# Patient Record
(demographics unavailable — no encounter records)

---

## 2024-12-24 NOTE — PHYSICAL EXAM
[Normal S1, S2] : normal S1, S2 [Murmur] : murmur [Normal] : moves all extremities, no focal deficits, normal speech [de-identified] :  No carotid bruits auscultated bilaterally. [de-identified] : 2/6 systolic ejection murmur base of heart

## 2024-12-24 NOTE — REVIEW OF SYSTEMS
[Feeling Fatigued] : feeling fatigued [Joint Pain] : joint pain [Joint Stiffness] : joint stiffness [Negative] : Neurological [FreeTextEntry5] : see HPI [FreeTextEntry6] : see HPI [FreeTextEntry9] : chronic back pain

## 2024-12-24 NOTE — CARDIOLOGY SUMMARY
[de-identified] : 12/24/2024, NSR with possible YOANNA 5/31/2024, NSR with PVC, combined atrial enlargement. [de-identified] : 6/21/2024, LV EF 55-60%, mild MR, mild AI, calcified AV with no stenosis.

## 2024-12-24 NOTE — HISTORY OF PRESENT ILLNESS
[FreeTextEntry1] : Historical Perspective: 72 year old female with chronic back pain, HTN presents for a cardiac evaluation. Patient had murmur auscultated by PCP. Patient not very active, but denies any chest pain, dyspnea or palpitations. BP elevated today, but patient states she forgot to take BP medication today.  There is no history of MI, CVA, CHF, or previous coronary intervention.  Current Health Status: Patient with no chest pain, SOB, or palpitations. No hospitalizations since seeing me last. Remains compliant with medications and reports no adverse effects. Notes better BP control with Metoprolol. Hasn't taken medications today yet. Usually takes medications between 10-11am

## 2024-12-24 NOTE — DISCUSSION/SUMMARY
[FreeTextEntry1] : 1. Aortic Valve Insufficiency: on echocardiogram from 6/21/2024, AV with calcification but no stenosis. Mild AI, recommend periodic echo surveillance.   2. HTN: continue olmesartan 20mg daily. Patient notes very labile BPs in the past, which she states metoprolol has helped with. Recommend increasing Toprol XL 25mg daily--BID to help with this labile HTN  Follow up in 6 months. [EKG obtained to assist in diagnosis and management of assessed problem(s)] : EKG obtained to assist in diagnosis and management of assessed problem(s)

## 2025-07-15 NOTE — CARDIOLOGY SUMMARY
[de-identified] : 7/15/2025, NSR with possible combined atrial enlargement.  12/24/2024, NSR with possible YOANNA 5/31/2024, NSR with PVC, combined atrial enlargement. [de-identified] : 6/21/2024, LV EF 55-60%, mild MR, mild AI, calcified AV with no stenosis.

## 2025-07-15 NOTE — PHYSICAL EXAM
[Normal S1, S2] : normal S1, S2 [Murmur] : murmur [Normal] : moves all extremities, no focal deficits, normal speech [de-identified] :  No carotid bruits auscultated bilaterally. [de-identified] : 2/6 systolic ejection murmur base of heart

## 2025-07-15 NOTE — DISCUSSION/SUMMARY
[FreeTextEntry1] : 1. Aortic Valve Insufficiency: on echocardiogram from 6/21/2024, AV with calcification but no stenosis. Mild AI, recommend periodic echo surveillance.   2. HTN: continue olmesartan 20mg daily and Toprol XL 25mg BID. Goal BP less than 130/80. Recommend low salt diet.  Follow up in 6 months.  [EKG obtained to assist in diagnosis and management of assessed problem(s)] : EKG obtained to assist in diagnosis and management of assessed problem(s)

## 2025-07-15 NOTE — HISTORY OF PRESENT ILLNESS
[FreeTextEntry1] : Historical Perspective: 73 year old female with chronic back pain, HTN presents for a cardiac evaluation. Patient had murmur auscultated by PCP. Patient not very active, but denies any chest pain, dyspnea or palpitations. BP elevated today, but patient states she forgot to take BP medication today.  There is no history of MI, CVA, CHF, or previous coronary intervention.  Current Health Status: Patient with no chest pain, SOB, or palpitations. No hospitalizations since seeing me last. Remains compliant with medications and reports no adverse effects.